# Patient Record
Sex: MALE | Race: WHITE | NOT HISPANIC OR LATINO | Employment: FULL TIME | URBAN - NONMETROPOLITAN AREA
[De-identification: names, ages, dates, MRNs, and addresses within clinical notes are randomized per-mention and may not be internally consistent; named-entity substitution may affect disease eponyms.]

---

## 2017-09-12 ENCOUNTER — OFFICE VISIT (OUTPATIENT)
Dept: URGENT CARE | Facility: PHYSICIAN GROUP | Age: 62
End: 2017-09-12
Payer: COMMERCIAL

## 2017-09-12 VITALS
TEMPERATURE: 98.8 F | WEIGHT: 192 LBS | BODY MASS INDEX: 28.44 KG/M2 | HEART RATE: 81 BPM | SYSTOLIC BLOOD PRESSURE: 124 MMHG | HEIGHT: 69 IN | RESPIRATION RATE: 16 BRPM | DIASTOLIC BLOOD PRESSURE: 82 MMHG | OXYGEN SATURATION: 96 %

## 2017-09-12 DIAGNOSIS — S39.012A LUMBOSACRAL STRAIN, INITIAL ENCOUNTER: ICD-10-CM

## 2017-09-12 PROCEDURE — 99214 OFFICE O/P EST MOD 30 MIN: CPT | Performed by: PHYSICIAN ASSISTANT

## 2017-09-12 RX ORDER — ACETAMINOPHEN 325 MG/1
650 TABLET ORAL EVERY 4 HOURS PRN
COMMUNITY
End: 2023-12-12

## 2017-09-12 RX ORDER — KETOROLAC TROMETHAMINE 30 MG/ML
30 INJECTION, SOLUTION INTRAMUSCULAR; INTRAVENOUS ONCE
Status: COMPLETED | OUTPATIENT
Start: 2017-09-12 | End: 2017-09-12

## 2017-09-12 RX ORDER — CYCLOBENZAPRINE HCL 10 MG
10 TABLET ORAL 3 TIMES DAILY PRN
Qty: 15 TAB | Refills: 0 | Status: SHIPPED | OUTPATIENT
Start: 2017-09-12 | End: 2023-12-12

## 2017-09-12 RX ADMIN — KETOROLAC TROMETHAMINE 30 MG: 30 INJECTION, SOLUTION INTRAMUSCULAR; INTRAVENOUS at 15:11

## 2017-09-12 ASSESSMENT — ENCOUNTER SYMPTOMS
PERIANAL NUMBNESS: 0
SENSORY CHANGE: 0
PARESTHESIAS: 0
TINGLING: 0
ABDOMINAL PAIN: 0
FALLS: 0
PARESIS: 0
BACK PAIN: 1
FEVER: 0
BOWEL INCONTINENCE: 0
HEADACHES: 0
LEG PAIN: 0
NUMBNESS: 0
FOCAL WEAKNESS: 0
WEAKNESS: 0
WEIGHT LOSS: 0

## 2017-09-12 NOTE — PROGRESS NOTES
Subjective:      Dov Avila is a 62 y.o. male who presents with Back Pain (low back pain primarily left side x3days )            Left-sided lower back pain for the last 3 days. He reports he has been doing a lot of lifting and moving at home and feels that he may have pulled a muscle in his back. Denies numbness, tingling, weakness, loss of bowel or bladder control, saddle paresthesias, or other complaints. Mild improvement with NSAIDs. Pain worse at night when trying to sleep      Back Pain   This is a new problem. The current episode started in the past 7 days. The problem occurs constantly. The problem has been waxing and waning since onset. The pain is present in the lumbar spine and sacro-iliac. The quality of the pain is described as aching, stabbing and cramping. The pain does not radiate. The pain is moderate. The symptoms are aggravated by bending, twisting and position. Pertinent negatives include no abdominal pain, bladder incontinence, bowel incontinence, chest pain, dysuria, fever, headaches, leg pain, numbness, paresis, paresthesias, pelvic pain, perianal numbness, tingling, weakness or weight loss. He has tried NSAIDs for the symptoms. The treatment provided mild relief.       Review of Systems   Constitutional: Negative for fever and weight loss.   Cardiovascular: Negative for chest pain.   Gastrointestinal: Negative for abdominal pain and bowel incontinence.   Genitourinary: Negative for bladder incontinence, dysuria and pelvic pain.   Musculoskeletal: Positive for back pain. Negative for falls.   Neurological: Negative for tingling, sensory change, focal weakness, weakness, numbness, headaches and paresthesias.     Allergies:Review of patient's allergies indicates no known allergies.    Current Outpatient Prescriptions Ordered in [facility]   Medication Sig Dispense Refill   • acetaminophen (TYLENOL) 325 MG Tab Take 650 mg by mouth every four hours as needed.     • cyclobenzaprine (FLEXERIL) 10 MG  "Tab Take 1 Tab by mouth 3 times a day as needed. 15 Tab 0     Current Facility-Administered Medications Ordered in [facility]   Medication Dose Route Frequency Provider Last Rate Last Dose   • ketorolac (TORADOL) injection 30 mg  30 mg Intramuscular Once NEVIN Roberts           No past medical history on file.    Social History   Substance Use Topics   • Smoking status: Former Smoker     Types: Cigars     Quit date: 9/12/1987   • Smokeless tobacco: Never Used   • Alcohol use 0.6 oz/week     1 Cans of beer per week      Comment: seldom       No family status information on file.   History reviewed. No pertinent family history.       Objective:     /82   Pulse 81   Temp 37.1 °C (98.8 °F)   Resp 16   Ht 1.753 m (5' 9\")   Wt 87.1 kg (192 lb)   SpO2 96%   BMI 28.35 kg/m²      Physical Exam   Constitutional: He is oriented to person, place, and time. He appears well-developed and well-nourished. No distress.   Appears somewhat uncomfortable with movement   HENT:   Head: Normocephalic and atraumatic.   Eyes: Right eye exhibits no discharge. Left eye exhibits no discharge.   Neck: Normal range of motion. Neck supple.   Cardiovascular: Normal rate.    Pulmonary/Chest: Effort normal.   Musculoskeletal:        Cervical back: Normal.        Thoracic back: He exhibits decreased range of motion. He exhibits no tenderness, no bony tenderness, no swelling, no edema, no deformity, no laceration, no pain and no spasm.        Lumbar back: He exhibits decreased range of motion, tenderness, pain and spasm. He exhibits no bony tenderness, no swelling, no edema, no deformity and no laceration.        Back:    Neurological: He is alert and oriented to person, place, and time.   Skin: Skin is warm and dry. He is not diaphoretic.   Psychiatric: He has a normal mood and affect. His behavior is normal. Judgment and thought content normal.   Nursing note and vitals reviewed.              Assessment/Plan:     1. Lumbosacral strain, " initial encounter  ketorolac (TORADOL) injection 30 mg    cyclobenzaprine (FLEXERIL) 10 MG Tab    Left-sided muscular tenderness. No bony tenderness. Likely muscle strain. Given Toradol. Start Flexeril. Recommended heat, massage, NSAIDs. Return if worsening       [employer] Interactive Patient Education given: Lumbosacral strain    Please note that this dictation was created using voice recognition software. I have made every reasonable attempt to correct obvious errors, but I expect that there are errors of grammar and possibly content that I did not discover before finalizing the note.

## 2017-09-12 NOTE — PATIENT INSTRUCTIONS
Lumbosacral Strain  Lumbosacral strain is a strain of any of the parts that make up your lumbosacral vertebrae. Your lumbosacral vertebrae are the bones that make up the lower third of your backbone. Your lumbosacral vertebrae are held together by muscles and tough, fibrous tissue (ligaments).   CAUSES   A sudden blow to your back can cause lumbosacral strain. Also, anything that causes an excessive stretch of the muscles in the low back can cause this strain. This is typically seen when people exert themselves strenuously, fall, lift heavy objects, bend, or crouch repeatedly.  RISK FACTORS  · Physically demanding work.  · Participation in pushing or pulling sports or sports that require a sudden twist of the back (tennis, golf, baseball).  · Weight lifting.  · Excessive lower back curvature.  · Forward-tilted pelvis.  · Weak back or abdominal muscles or both.  · Tight hamstrings.  SIGNS AND SYMPTOMS   Lumbosacral strain may cause pain in the area of your injury or pain that moves (radiates) down your leg.   DIAGNOSIS  Your health care provider can often diagnose lumbosacral strain through a physical exam. In some cases, you may need tests such as X-ray exams.   TREATMENT   Treatment for your lower back injury depends on many factors that your clinician will have to evaluate. However, most treatment will include the use of anti-inflammatory medicines.  HOME CARE INSTRUCTIONS   · Avoid hard physical activities (tennis, racquetball, waterskiing) if you are not in proper physical condition for it. This may aggravate or create problems.  · If you have a back problem, avoid sports requiring sudden body movements. Swimming and walking are generally safer activities.  · Maintain good posture.  · Maintain a healthy weight.  · For acute conditions, you may put ice on the injured area.  ¨ Put ice in a plastic bag.  ¨ Place a towel between your skin and the bag.  ¨ Leave the ice on for 20 minutes, 2-3 times a day.  · When the  low back starts healing, stretching and strengthening exercises may be recommended.  SEEK MEDICAL CARE IF:  · Your back pain is getting worse.  · You experience severe back pain not relieved with medicines.  SEEK IMMEDIATE MEDICAL CARE IF:   · You have numbness, tingling, weakness, or problems with the use of your arms or legs.  · There is a change in bowel or bladder control.  · You have increasing pain in any area of the body, including your belly (abdomen).  · You notice shortness of breath, dizziness, or feel faint.  · You feel sick to your stomach (nauseous), are throwing up (vomiting), or become sweaty.  · You notice discoloration of your toes or legs, or your feet get very cold.  MAKE SURE YOU:   · Understand these instructions.  · Will watch your condition.  · Will get help right away if you are not doing well or get worse.     This information is not intended to replace advice given to you by your health care provider. Make sure you discuss any questions you have with your health care provider.     Document Released: 09/27/2006 Document Revised: 01/08/2016 Document Reviewed: 08/06/2014  [employer] Interactive Patient Education ©2016 [employer]

## 2023-08-14 PROBLEM — M16.12 PRIMARY OSTEOARTHRITIS OF LEFT HIP: Status: ACTIVE | Noted: 2023-08-14

## 2024-10-07 ENCOUNTER — NON-PROVIDER VISIT (OUTPATIENT)
Dept: URGENT CARE | Facility: PHYSICIAN GROUP | Age: 69
End: 2024-10-07

## 2024-10-07 DIAGNOSIS — Z02.83 ENCOUNTER FOR DRUG SCREENING: ICD-10-CM

## 2024-10-07 PROCEDURE — 80305 DRUG TEST PRSMV DIR OPT OBS: CPT | Performed by: NURSE PRACTITIONER
